# Patient Record
Sex: FEMALE | Race: WHITE | Employment: PART TIME | URBAN - METROPOLITAN AREA
[De-identification: names, ages, dates, MRNs, and addresses within clinical notes are randomized per-mention and may not be internally consistent; named-entity substitution may affect disease eponyms.]

---

## 2021-03-12 ENCOUNTER — TRANSCRIBE ORDER (OUTPATIENT)
Dept: SCHEDULING | Age: 41
End: 2021-03-12

## 2021-03-12 DIAGNOSIS — N63.20 LUMP OF LEFT BREAST: Primary | ICD-10-CM

## 2021-03-19 ENCOUNTER — HOSPITAL ENCOUNTER (OUTPATIENT)
Dept: MAMMOGRAPHY | Age: 41
Discharge: HOME | End: 2021-03-19
Attending: OBSTETRICS & GYNECOLOGY
Payer: COMMERCIAL

## 2021-03-19 DIAGNOSIS — N63.20 LUMP OF LEFT BREAST: ICD-10-CM

## 2021-03-19 PROCEDURE — 76642 ULTRASOUND BREAST LIMITED: CPT

## 2021-03-19 PROCEDURE — 77066 DX MAMMO INCL CAD BI: CPT

## 2021-03-24 ENCOUNTER — HOSPITAL ENCOUNTER (OUTPATIENT)
Dept: MAMMOGRAPHY | Age: 41
Discharge: HOME | End: 2021-03-24
Attending: OBSTETRICS & GYNECOLOGY
Payer: COMMERCIAL

## 2021-03-24 VITALS — DIASTOLIC BLOOD PRESSURE: 82 MMHG | SYSTOLIC BLOOD PRESSURE: 132 MMHG | HEART RATE: 89 BPM

## 2021-03-24 DIAGNOSIS — R92.8 ABNORMAL FINDING ON BREAST IMAGING: ICD-10-CM

## 2021-03-24 DIAGNOSIS — N63.20 BREAST MASS, LEFT: ICD-10-CM

## 2021-03-24 DIAGNOSIS — N63.0 BREAST MASS: ICD-10-CM

## 2021-03-24 PROCEDURE — 88305 TISSUE EXAM BY PATHOLOGIST: CPT

## 2021-03-24 PROCEDURE — 77065 DX MAMMO INCL CAD UNI: CPT

## 2021-03-24 PROCEDURE — 74011000250 HC RX REV CODE- 250: Performed by: OBSTETRICS & GYNECOLOGY

## 2021-03-24 PROCEDURE — 77030013267 US BX BREAST LT 1ST LESION W/CLIP AND SPECIMEN

## 2021-03-24 RX ORDER — LIDOCAINE HYDROCHLORIDE 10 MG/ML
5 INJECTION INFILTRATION; PERINEURAL
Status: COMPLETED | OUTPATIENT
Start: 2021-03-24 | End: 2021-03-24

## 2021-03-24 RX ADMIN — LIDOCAINE HYDROCHLORIDE 5 ML: 10 INJECTION, SOLUTION INFILTRATION; PERINEURAL at 08:31

## 2021-03-25 NOTE — PROGRESS NOTES
I spoke with Ms Ward Holguin phone regarding the results of her Lt breast U/S bx. Pathology: benign. She had no post bx issues or concerns and will return for her yearly screening March 2022.